# Patient Record
Sex: MALE | URBAN - METROPOLITAN AREA
[De-identification: names, ages, dates, MRNs, and addresses within clinical notes are randomized per-mention and may not be internally consistent; named-entity substitution may affect disease eponyms.]

---

## 2018-08-31 ENCOUNTER — RECORDS - HEALTHEAST (OUTPATIENT)
Dept: LAB | Facility: CLINIC | Age: 33
End: 2018-08-31

## 2018-08-31 LAB
ALBUMIN SERPL-MCNC: 4.4 G/DL (ref 3.5–5)
ALP SERPL-CCNC: 91 U/L (ref 45–120)
ALT SERPL W P-5'-P-CCNC: 13 U/L (ref 0–45)
ANION GAP SERPL CALCULATED.3IONS-SCNC: 11 MMOL/L (ref 5–18)
AST SERPL W P-5'-P-CCNC: 19 U/L (ref 0–40)
BILIRUB SERPL-MCNC: 0.7 MG/DL (ref 0–1)
BUN SERPL-MCNC: 10 MG/DL (ref 8–22)
CALCIUM SERPL-MCNC: 10.1 MG/DL (ref 8.5–10.5)
CHLORIDE BLD-SCNC: 102 MMOL/L (ref 98–107)
CO2 SERPL-SCNC: 28 MMOL/L (ref 22–31)
CREAT SERPL-MCNC: 1.48 MG/DL (ref 0.7–1.3)
ERYTHROCYTE [SEDIMENTATION RATE] IN BLOOD BY WESTERGREN METHOD: 1 MM/HR (ref 0–15)
GFR SERPL CREATININE-BSD FRML MDRD: 56 ML/MIN/1.73M2
GLUCOSE BLD-MCNC: 82 MG/DL (ref 70–125)
POTASSIUM BLD-SCNC: 4.5 MMOL/L (ref 3.5–5)
PROT SERPL-MCNC: 7.2 G/DL (ref 6–8)
SODIUM SERPL-SCNC: 141 MMOL/L (ref 136–145)

## 2018-09-05 LAB
GLIADIN IGA SER-ACNC: 1.1 U/ML
GLIADIN IGG SER-ACNC: 0.4 U/ML
IGA SERPL-MCNC: 138 MG/DL (ref 65–400)
TTG IGA SER-ACNC: <0.1 U/ML
TTG IGG SER-ACNC: <0.6 U/ML

## 2018-09-11 ENCOUNTER — RECORDS - HEALTHEAST (OUTPATIENT)
Dept: LAB | Facility: CLINIC | Age: 33
End: 2018-09-11

## 2018-09-11 LAB
ALBUMIN SERPL-MCNC: 4.5 G/DL (ref 3.5–5)
ANION GAP SERPL CALCULATED.3IONS-SCNC: 10 MMOL/L (ref 5–18)
BUN SERPL-MCNC: 14 MG/DL (ref 8–22)
CALCIUM SERPL-MCNC: 10.2 MG/DL (ref 8.5–10.5)
CHLORIDE BLD-SCNC: 107 MMOL/L (ref 98–107)
CO2 SERPL-SCNC: 28 MMOL/L (ref 22–31)
CREAT SERPL-MCNC: 1.4 MG/DL (ref 0.7–1.3)
GFR SERPL CREATININE-BSD FRML MDRD: 60 ML/MIN/1.73M2
GLUCOSE BLD-MCNC: 96 MG/DL (ref 70–125)
PHOSPHATE SERPL-MCNC: 3.3 MG/DL (ref 2.5–4.5)
POTASSIUM BLD-SCNC: 4.8 MMOL/L (ref 3.5–5)
SODIUM SERPL-SCNC: 145 MMOL/L (ref 136–145)
TSH SERPL DL<=0.005 MIU/L-ACNC: 0.44 UIU/ML (ref 0.3–5)

## 2019-09-16 ENCOUNTER — RECORDS - HEALTHEAST (OUTPATIENT)
Dept: LAB | Facility: CLINIC | Age: 34
End: 2019-09-16

## 2019-09-16 LAB
ALBUMIN SERPL-MCNC: 4.4 G/DL (ref 3.5–5)
ALP SERPL-CCNC: 92 U/L (ref 45–120)
ALT SERPL W P-5'-P-CCNC: 15 U/L (ref 0–45)
ANION GAP SERPL CALCULATED.3IONS-SCNC: 12 MMOL/L (ref 5–18)
AST SERPL W P-5'-P-CCNC: 21 U/L (ref 0–40)
BILIRUB SERPL-MCNC: 0.8 MG/DL (ref 0–1)
BUN SERPL-MCNC: 14 MG/DL (ref 8–22)
CALCIUM SERPL-MCNC: 9.7 MG/DL (ref 8.5–10.5)
CHLORIDE BLD-SCNC: 106 MMOL/L (ref 98–107)
CO2 SERPL-SCNC: 25 MMOL/L (ref 22–31)
CREAT SERPL-MCNC: 1 MG/DL (ref 0.7–1.3)
GFR SERPL CREATININE-BSD FRML MDRD: >60 ML/MIN/1.73M2
GLUCOSE BLD-MCNC: 62 MG/DL (ref 70–125)
IRON SERPL-MCNC: 88 UG/DL (ref 42–175)
POTASSIUM BLD-SCNC: 4.1 MMOL/L (ref 3.5–5)
PROT SERPL-MCNC: 7 G/DL (ref 6–8)
SODIUM SERPL-SCNC: 143 MMOL/L (ref 136–145)
TSH SERPL DL<=0.005 MIU/L-ACNC: 0.3 UIU/ML (ref 0.3–5)
VIT B12 SERPL-MCNC: 783 PG/ML (ref 213–816)

## 2019-09-17 ENCOUNTER — RECORDS - HEALTHEAST (OUTPATIENT)
Dept: LAB | Facility: CLINIC | Age: 34
End: 2019-09-17

## 2019-09-17 LAB
B BURGDOR IGG+IGM SER QL: 0.07 INDEX VALUE
BASOPHILS # BLD AUTO: 0 THOU/UL (ref 0–0.2)
BASOPHILS NFR BLD AUTO: 1 % (ref 0–2)
EOSINOPHIL # BLD AUTO: 0.3 THOU/UL (ref 0–0.4)
EOSINOPHIL NFR BLD AUTO: 6 % (ref 0–6)
ERYTHROCYTE [DISTWIDTH] IN BLOOD BY AUTOMATED COUNT: 13.3 % (ref 11–14.5)
HCT VFR BLD AUTO: 44.8 % (ref 40–54)
HGB BLD-MCNC: 14.5 G/DL (ref 14–18)
LAB AP CHARGES (HE HISTORICAL CONVERSION): NORMAL
LYMPHOCYTES # BLD AUTO: 1.7 THOU/UL (ref 0.8–4.4)
LYMPHOCYTES NFR BLD AUTO: 42 % (ref 20–40)
MCH RBC QN AUTO: 28 PG (ref 27–34)
MCHC RBC AUTO-ENTMCNC: 32.4 G/DL (ref 32–36)
MCV RBC AUTO: 87 FL (ref 80–100)
MONOCYTES # BLD AUTO: 0.3 THOU/UL (ref 0–0.9)
MONOCYTES NFR BLD AUTO: 9 % (ref 2–10)
NEUTROPHILS # BLD AUTO: 1.7 THOU/UL (ref 2–7.7)
NEUTROPHILS NFR BLD AUTO: 42 % (ref 50–70)
OVALOCYTES: ABNORMAL
PATH REPORT.COMMENTS IMP SPEC: NORMAL
PATH REPORT.COMMENTS IMP SPEC: NORMAL
PATH REPORT.FINAL DX SPEC: NORMAL
PATH REPORT.MICROSCOPIC SPEC OTHER STN: ABNORMAL
PATH REPORT.MICROSCOPIC SPEC OTHER STN: NORMAL
PATH REPORT.RELEVANT HX SPEC: NORMAL
PLAT MORPH BLD: NORMAL
PLATELET # BLD AUTO: 141 THOU/UL (ref 140–440)
PMV BLD AUTO: 11.2 FL (ref 8.5–12.5)
RBC # BLD AUTO: 5.17 MILL/UL (ref 4.4–6.2)
WBC: 4 THOU/UL (ref 4–11)

## 2019-09-18 LAB — 25(OH)D3 SERPL-MCNC: 17 NG/ML (ref 30–80)

## 2020-07-03 ENCOUNTER — ALLIED HEALTH/NURSE VISIT (OUTPATIENT)
Dept: PHARMACY | Facility: PHYSICIAN GROUP | Age: 35
End: 2020-07-03
Payer: COMMERCIAL

## 2020-07-03 DIAGNOSIS — F32.A DEPRESSION, UNSPECIFIED DEPRESSION TYPE: Primary | ICD-10-CM

## 2020-07-03 PROCEDURE — 99207 ZZC NO CHARGE LOS: CPT | Performed by: PHARMACIST

## 2020-07-03 RX ORDER — VENLAFAXINE HYDROCHLORIDE 37.5 MG/1
37.5 CAPSULE, EXTENDED RELEASE ORAL DAILY
COMMUNITY

## 2020-07-03 ASSESSMENT — PATIENT HEALTH QUESTIONNAIRE - PHQ9: SUM OF ALL RESPONSES TO PHQ QUESTIONS 1-9: 23

## 2020-07-03 NOTE — PROGRESS NOTES
Clinical Pharmacy Consult:                                                    Vipul Guo is a 34 year old male called for a clinical pharmacist consult.  He was referred to me from Dr. Verde.     Reason for Consult: Psychiatric medication review, education, and testing for [employer] psychotropic pharmacogenomic test.    Discussion:   Indication for testing: Depression and Anxiety  Current medication(s): Venlafaxine 37.5 mg ER daily.  Patient's reasons for doing pharmacogenetic testing through [facility]: Patient would like to proceed with the gene site test if affordable because he does not like the idea of doing trial and error for trying medications.  He would like to have as much information as possible to choose an alternative antidepressant medication so that he is does not have to put so many different types of medications or chemicals into his body.    Patient's expectations from test results: Understands that the test would help show was how his body metabolizes medications, but will not necessarily tell us exactly how we will respond to medications or what side effects he might have.  Patient's concerns about test: primarily would be concerned with the cost of the test.  He would like to check with his insurance and with the gene site company to see how much it might cost for him or if his insurance covers it.  He would like to review the information for the payment plans of this test and on the financial program available through the [employer].  PHQ 6/29/2020   PHQ-9 Total Score 23   Q9: Thoughts of better off dead/self-harm past 2 weeks Not at all         Insurance information:   Patient is the primary account [employer].      Therapies Tried and Response:   Fluoxetine 20 mg, on this for 7 months.  It caused GI upset, so patient stopped taking it.  He felt the intermittent abdominal pain was related to fluoxetine.    Pt has suffered for a long time with weird abdominal pains.  He hasn't known  what is causing it.  He has tried a gluten free diet which has helped a lot.  The fluoxetine seemed to cause increased stomach pains.  He forgot to take fluoxetine sometimes.  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  A few weeks ago patient stopped fluoxetine completely due to the possible side effects and sometimes forgetting to take it.  It had helped some with anxiety but still having symptoms despite taking fluoxetine 20 mg daily     He got a tetanus shot recently, and has felt a bit weird after his tetanus shot.  He does not know if the lightheadedness and headaches is from the tetanus shot or from starting venlafaxine.  Has only been on the venlafaxine for a few days.  He hasn't noticed any GI side effects, no abdominal pain, diarrhea.  He has felt lightheaded and had some headaches.  He also had lower water intake, it can be hard to stay hydrated while at work.  Spends 90% of his day on the floor, it's hard to take breaks during the day to drink water.  He is an , and is very busy.  His lightheadedness and headaches could also be from being a little dehydrated.  He has noticed having some more energy during the day since starting venlafaxine.  When on fluoxetine he felt so tired, would sleep 12 hours, still tired in the AM when on it.  Although is only been a few days he thinks the venlafaxine might have been helping to give him a bit more energy and he is not feeling as fatigued after work.    Medication adherence: Sometimes forgets medications due to his changing schedule.  He normally took fluoxetine in the morning with breakfast, but some days he would leave for work without eating any breakfast or does forget to take it if his morning was very busy.    Education Provided:  - Potential impact of pharmacokinetic and pharmacodynamic genetic variation on medication metabolism and action  - Reviewed genes and medications tested   - Reviewed what  report will look like  - How information may be helpful in guiding treatment  - How results may be useful when reviewing other medications  - Limitations of test results on individual medication experience    Plan:   1. Order entered on [facility] portal and [facility] will send the test kit to him either today or tomorrow.    2. PharmD will review results and recommendations with patient when the results are in if he decides to take the test.  Patient is going to first inquire about the cost of the test and if his insurance plan covers it first.      Wayne LopezD  Medication Therapy Management Pharmacist  Pager: [phone]      Note: Patient's insurance plan does not cover MTM comprehensive appointments.

## 2020-09-11 ENCOUNTER — ALLIED HEALTH/NURSE VISIT (OUTPATIENT)
Dept: PHARMACY | Facility: PHYSICIAN GROUP | Age: 35
End: 2020-09-11
Payer: COMMERCIAL

## 2020-09-11 DIAGNOSIS — F32.A DEPRESSION, UNSPECIFIED DEPRESSION TYPE: Primary | ICD-10-CM

## 2020-09-11 PROCEDURE — 99207 ZZC NO CHARGE LOS: CPT | Performed by: PHARMACIST

## 2020-09-11 NOTE — PROGRESS NOTES
Clinical Pharmacy Consult:                                                    Vipul Guo is a 34 year old male called for a clinical pharmacist consult.  He was referred to me from Dr. Verde.     Reason for Consult: Calling patient to review his Genes[facility] results with him.      Discussion:  Indication for testing: Depression and Anxiety  Current medication(s): Venlafaxine 37.5 mg ER daily.  He is noticing increase in energy, but sometimes feels jittery and like his anxiety is worse.  He is still a little fischer.  He feels it likely isn't the right medication for him.    Therapies Tried and Response:   Fluoxetine 20 mg, on this for 7 months.  It caused GI upset, so patient stopped taking it.  He felt the intermittent abdominal pain was related to fluoxetine.    Results:   Pharmacodynamic Genes   SLC6A4: L/A heterozygous.  Intermediate response.  HTR2A:G/G homozygous. Increased sensitivity.  HLA-B*1502: not present, lower risk  HLA-A*3101: A/A homozygous, lower risk.       Pharmacokinetic Genes   Ultrarapid Metabolizer:  CY, UGT1A4    Extensive Metabolizer (normal):  INM1K05, CYP2C9, CYP2D6, CY    Intermediate Metabolizer:  CYP2B6, [id number]         Interpretations:  - Patient is not at a higher risk for SJS or TEN from certain mood stabilizers (anticonvulsants).  - Patient has reduced expression of 5HT transporter gene, and has a  likelyhood of response to SSRIs.  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  - Patient is an intermediate metabolizer of a few CYP enzymes, and may have higher plasma concentrations of medications metabolized through these pathways.    Plan:  1. Scheduled appointment for Dr. Verde to  follow up with patient on depression and genesight results.

## 2021-05-27 ENCOUNTER — RECORDS - HEALTHEAST (OUTPATIENT)
Dept: ADMINISTRATIVE | Facility: CLINIC | Age: 36
End: 2021-05-27

## 2021-06-01 ENCOUNTER — RECORDS - HEALTHEAST (OUTPATIENT)
Dept: ADMINISTRATIVE | Facility: CLINIC | Age: 36
End: 2021-06-01